# Patient Record
Sex: MALE | Race: WHITE | Employment: OTHER | URBAN - METROPOLITAN AREA
[De-identification: names, ages, dates, MRNs, and addresses within clinical notes are randomized per-mention and may not be internally consistent; named-entity substitution may affect disease eponyms.]

---

## 2024-01-02 ENCOUNTER — HOSPITAL ENCOUNTER (OUTPATIENT)
Age: 69
Discharge: HOME | End: 2024-01-02
Payer: MEDICARE

## 2024-01-02 VITALS
SYSTOLIC BLOOD PRESSURE: 160 MMHG | HEIGHT: 71 IN | BODY MASS INDEX: 26.6 KG/M2 | HEART RATE: 87 BPM | DIASTOLIC BLOOD PRESSURE: 104 MMHG | WEIGHT: 190 LBS | TEMPERATURE: 97 F | RESPIRATION RATE: 16 BRPM | OXYGEN SATURATION: 98 %

## 2024-01-02 DIAGNOSIS — H01.00A BLEPHARITIS OF UPPER AND LOWER EYELIDS OF BOTH EYES, UNSPECIFIED TYPE: ICD-10-CM

## 2024-01-02 DIAGNOSIS — H01.00B BLEPHARITIS OF UPPER AND LOWER EYELIDS OF BOTH EYES, UNSPECIFIED TYPE: ICD-10-CM

## 2024-01-02 DIAGNOSIS — H10.31 ACUTE CONJUNCTIVITIS OF RIGHT EYE, UNSPECIFIED ACUTE CONJUNCTIVITIS TYPE: Primary | ICD-10-CM

## 2024-01-02 DIAGNOSIS — I10 ELEVATED BLOOD PRESSURE READING WITH DIAGNOSIS OF HYPERTENSION: ICD-10-CM

## 2024-01-02 RX ORDER — LOSARTAN POTASSIUM 50 MG/1
50 TABLET ORAL DAILY
COMMUNITY
Start: 2023-10-20

## 2024-01-02 RX ORDER — DEXAMETHASONE 4 MG/1
4 TABLET ORAL ONCE
Status: COMPLETED | OUTPATIENT
Start: 2024-01-02 | End: 2024-01-02

## 2024-01-02 RX ORDER — ROSUVASTATIN CALCIUM 20 MG/1
20 TABLET, COATED ORAL DAILY
COMMUNITY
Start: 2023-10-20

## 2024-01-02 RX ORDER — OFLOXACIN 3 MG/ML
1 SOLUTION/ DROPS OPHTHALMIC 4 TIMES DAILY
Qty: 1 EACH | Refills: 0 | Status: SHIPPED | OUTPATIENT
Start: 2024-01-02

## 2024-01-02 RX ORDER — PREDNISONE 20 MG/1
40 TABLET ORAL DAILY
Qty: 6 TABLET | Refills: 0 | Status: SHIPPED | OUTPATIENT
Start: 2024-01-02 | End: 2024-01-05

## 2024-01-03 NOTE — DISCHARGE INSTRUCTIONS
Please return to the ER/clinic if symptoms worsen. Follow-up with your PCP in 24-48 hours as needed.    The Decadron to work in your system the next several days.  Avoid touching or rubbing the eyes.  Use a warm compress to clean out the follicular ducts.  Use the full course of eyedrops as prescribed.  Recommend taking an over the counter antihistamine daily: IE zyrtec/claritin.  You may start the additional prednisone on day 2 or 3 if symptoms persist.    Follow-up with the ophthalmologist for further evaluation and treatment.  Also follow the Dash dietary plan and log your blood pressures at different intervals to take to your primary care physician.

## 2024-01-03 NOTE — ED PROVIDER NOTES
Patient Seen in: [facility]      History     Chief Complaint   Patient presents with    Eye Visual Problem            Stated Complaint: Red eye    Subjective:   HPI    68-year-old male here with copious amounts of discharge from his right eye.  Patient reports both his wife and granddaughter had pinkeye also.  Patient admits to rubbing his lids aggressively.  Patient denies visual changes or foreign body sensation.  Patient does have a history of hypertension is taking his medications.  Patient states he knows it is not running according to plan he is going to get into his primary care doctor for adjustment.  Patient denies chest pain, shortness of breath, cough, abdominal pain, nausea, vomiting or diarrhea.  Patient is tolerating p.o. speaking full sentences.  Afebrile.    Objective:   History reviewed. No pertinent past medical history.           History reviewed. No pertinent surgical history.           The patient's medication list, past medical history and social history elements  as listed in today's nurse's notes are reviewed and agree.   The patient's family history is reviewed and is noncontributory to the presenting problem, except as indicated as above.     Social History     Socioeconomic History    Marital status:    Tobacco Use    Smoking status: Former    Smokeless tobacco: Former              Review of Systems    Positive for stated complaint: Red eye  Other systems are as noted in HPI.  Constitutional and vital signs reviewed.      All other systems reviewed and negative except as noted above.    Physical Exam     ED Triage Vitals [01/02/24 1907]   BP (!) 160/104   Pulse 87   Resp 16   Temp 97.4 °F (36.3 °C)   Temp src    SpO2 98 %   O2 Device None (Room air)       Current:BP (!) 160/104   Pulse 87   Temp 97.4 °F (36.3 °C)   Resp 16   Ht 180.3 cm (5' 11\")   Wt 86.2 kg   SpO2 98%   BMI 26.50 kg/m²     Right Eye Chart Acuity: 20/25, Corrected  Left Eye Chart  Acuity: 20/20, Corrected    Physical Exam  Vitals and nursing note reviewed.   Constitutional:       Appearance: Normal appearance. He is well-developed.   HENT:      Head: Normocephalic.      Right Ear: External ear normal.      Left Ear: External ear normal.      Nose: Nose normal.      Mouth/Throat:      Mouth: Mucous membranes are moist.   Eyes:      General:         Right eye: Discharge present.      Conjunctiva/sclera:      Right eye: Right conjunctiva is injected.      Pupils: Pupils are equal, round, and reactive to light.      Comments: bilateral lid swelling   Cardiovascular:      Rate and Rhythm: Normal rate and regular rhythm.      Heart sounds: Normal heart sounds.   Pulmonary:      Effort: Pulmonary effort is normal.      Breath sounds: Normal breath sounds.   Musculoskeletal:      Cervical back: Normal range of motion and neck supple.   Skin:     General: Skin is warm.      Capillary Refill: Capillary refill takes less than 2 seconds.   Neurological:      General: No focal deficit present.      Mental Status: He is alert and oriented to person, place, and time.   Psychiatric:         Mood and Affect: Mood normal.         Behavior: Behavior normal.         Thought Content: Thought content normal.         Judgment: Judgment normal.             ED Course                      MDM       Clinical Impression: R eye conjunctivitis/elevated blood pressure reading with diagnosis of HTN/blepharitis  Course of Treatment:   The Decadron to work in your system the next several days.  Avoid touching or rubbing the eyes.  Use a warm compress to clean out the follicular ducts.  Use the full course of eyedrops as prescribed.  Recommend taking an over the counter antihistamine daily: IE zyrtec/claritin.  You may start the additional prednisone on day 2 or 3 if symptoms persist.    Follow-up with the ophthalmologist for further evaluation and treatment.  Also follow the Dash dietary plan and log your blood pressures at  different intervals to take to your primary care physician.    The patient is encouraged to return if any concerning symptoms arise. Additional verbal discharge instructions are given and discussed. Discharge medications are discussed. The patient is in good condition throughout the visit today and remains so upon discharge. I discuss the plan of care with the patient, who expresses understanding. All questions and concerns are addressed to the patient's satisfaction prior to discharge today.  Previous conversations with PCP and charts were reviewed.                                       Disposition and Plan     Clinical Impression:  1. Acute conjunctivitis of right eye, unspecified acute conjunctivitis type    2. Elevated blood pressure reading with diagnosis of hypertension    3. Blepharitis of upper and lower eyelids of both eyes, unspecified type         Disposition:  Discharge  1/2/2024  7:54 pm    Follow-up:  Juwan Laguna  [address] [facility] [address]  [address].  [facility] [address]  [phone]          [facility]  [address] [place] [address] [address]  [facility] [address] [phone]              Medications Prescribed:  Current Discharge Medication List        START taking these medications    Details   ofloxacin 0.3 % Ophthalmic Solution Place 1 drop into both eyes 4 (four) times daily.  Qty: 1 each, Refills: 0      predniSONE 20 MG Oral Tab Take 2 tablets (40 mg total) by mouth daily for 3 days. Start on day 2-3 if symptoms persist  Qty: 6 tablet, Refills: 0